# Patient Record
Sex: FEMALE | Race: WHITE | NOT HISPANIC OR LATINO | Employment: FULL TIME | URBAN - METROPOLITAN AREA
[De-identification: names, ages, dates, MRNs, and addresses within clinical notes are randomized per-mention and may not be internally consistent; named-entity substitution may affect disease eponyms.]

---

## 2024-02-10 ENCOUNTER — ANESTHESIA EVENT (OUTPATIENT)
Dept: SURGERY | Facility: OTHER | Age: 34
End: 2024-02-10
Payer: MEDICARE

## 2024-02-10 ENCOUNTER — HOSPITAL ENCOUNTER (OUTPATIENT)
Facility: OTHER | Age: 34
Discharge: HOME | End: 2024-02-11
Attending: EMERGENCY MEDICINE | Admitting: OBSTETRICS & GYNECOLOGY
Payer: MEDICARE

## 2024-02-10 ENCOUNTER — ANESTHESIA (OUTPATIENT)
Dept: SURGERY | Facility: OTHER | Age: 34
End: 2024-02-10
Payer: MEDICARE

## 2024-02-10 DIAGNOSIS — K66.8 PNEUMOPERITONEUM: ICD-10-CM

## 2024-02-10 DIAGNOSIS — R10.84 GENERALIZED ABDOMINAL PAIN: ICD-10-CM

## 2024-02-10 DIAGNOSIS — T81.31XA VAGINAL CUFF DEHISCENCE, INITIAL ENCOUNTER: Primary | ICD-10-CM

## 2024-02-10 LAB
ALBUMIN SERPL BCP-MCNC: 3.8 G/DL (ref 3.5–5.2)
ALP SERPL-CCNC: 89 U/L (ref 55–135)
ALT SERPL W/O P-5'-P-CCNC: 237 U/L (ref 10–44)
ANION GAP SERPL CALC-SCNC: 8 MMOL/L (ref 8–16)
AST SERPL-CCNC: 201 U/L (ref 10–40)
BACTERIA #/AREA URNS HPF: ABNORMAL /HPF
BASOPHILS # BLD AUTO: 0.03 K/UL (ref 0–0.2)
BASOPHILS NFR BLD: 0.2 % (ref 0–1.9)
BILIRUB SERPL-MCNC: 2.3 MG/DL (ref 0.1–1)
BILIRUB UR QL STRIP: NEGATIVE
BUN SERPL-MCNC: 13 MG/DL (ref 6–20)
CALCIUM SERPL-MCNC: 8.9 MG/DL (ref 8.7–10.5)
CHLORIDE SERPL-SCNC: 108 MMOL/L (ref 95–110)
CLARITY UR: CLEAR
CO2 SERPL-SCNC: 21 MMOL/L (ref 23–29)
COLOR UR: YELLOW
CREAT SERPL-MCNC: 0.6 MG/DL (ref 0.5–1.4)
DIFFERENTIAL METHOD BLD: ABNORMAL
EOSINOPHIL # BLD AUTO: 0.1 K/UL (ref 0–0.5)
EOSINOPHIL NFR BLD: 0.7 % (ref 0–8)
ERYTHROCYTE [DISTWIDTH] IN BLOOD BY AUTOMATED COUNT: 15.7 % (ref 11.5–14.5)
EST. GFR  (NO RACE VARIABLE): >60 ML/MIN/1.73 M^2
GLUCOSE SERPL-MCNC: 108 MG/DL (ref 70–110)
GLUCOSE UR QL STRIP: NEGATIVE
HCT VFR BLD AUTO: 38.4 % (ref 37–48.5)
HGB BLD-MCNC: 12.6 G/DL (ref 12–16)
HGB UR QL STRIP: NEGATIVE
IMM GRANULOCYTES # BLD AUTO: 0.07 K/UL (ref 0–0.04)
IMM GRANULOCYTES NFR BLD AUTO: 0.5 % (ref 0–0.5)
KETONES UR QL STRIP: ABNORMAL
LEUKOCYTE ESTERASE UR QL STRIP: ABNORMAL
LIPASE SERPL-CCNC: 10 U/L (ref 4–60)
LYMPHOCYTES # BLD AUTO: 1.2 K/UL (ref 1–4.8)
LYMPHOCYTES NFR BLD: 8 % (ref 18–48)
MCH RBC QN AUTO: 28.6 PG (ref 27–31)
MCHC RBC AUTO-ENTMCNC: 32.8 G/DL (ref 32–36)
MCV RBC AUTO: 87 FL (ref 82–98)
MICROSCOPIC COMMENT: ABNORMAL
MONOCYTES # BLD AUTO: 0.8 K/UL (ref 0.3–1)
MONOCYTES NFR BLD: 5.2 % (ref 4–15)
NEUTROPHILS # BLD AUTO: 12.4 K/UL (ref 1.8–7.7)
NEUTROPHILS NFR BLD: 85.4 % (ref 38–73)
NITRITE UR QL STRIP: POSITIVE
NRBC BLD-RTO: 0 /100 WBC
PH UR STRIP: 7 [PH] (ref 5–8)
PLATELET # BLD AUTO: 300 K/UL (ref 150–450)
PMV BLD AUTO: 8.4 FL (ref 9.2–12.9)
POTASSIUM SERPL-SCNC: 3.7 MMOL/L (ref 3.5–5.1)
PROT SERPL-MCNC: 6.4 G/DL (ref 6–8.4)
PROT UR QL STRIP: ABNORMAL
RBC # BLD AUTO: 4.4 M/UL (ref 4–5.4)
RBC #/AREA URNS HPF: 6 /HPF (ref 0–4)
SODIUM SERPL-SCNC: 137 MMOL/L (ref 136–145)
SP GR UR STRIP: 1.02 (ref 1–1.03)
SQUAMOUS #/AREA URNS HPF: 1 /HPF
URN SPEC COLLECT METH UR: ABNORMAL
UROBILINOGEN UR STRIP-ACNC: ABNORMAL EU/DL
WBC # BLD AUTO: 14.52 K/UL (ref 3.9–12.7)
WBC #/AREA URNS HPF: 13 /HPF (ref 0–5)

## 2024-02-10 PROCEDURE — 63600175 PHARM REV CODE 636 W HCPCS

## 2024-02-10 PROCEDURE — 87077 CULTURE AEROBIC IDENTIFY: CPT | Performed by: EMERGENCY MEDICINE

## 2024-02-10 PROCEDURE — 96376 TX/PRO/DX INJ SAME DRUG ADON: CPT | Mod: 59

## 2024-02-10 PROCEDURE — 81000 URINALYSIS NONAUTO W/SCOPE: CPT | Performed by: EMERGENCY MEDICINE

## 2024-02-10 PROCEDURE — 96361 HYDRATE IV INFUSION ADD-ON: CPT | Mod: 59

## 2024-02-10 PROCEDURE — 87088 URINE BACTERIA CULTURE: CPT | Performed by: EMERGENCY MEDICINE

## 2024-02-10 PROCEDURE — 25500020 PHARM REV CODE 255

## 2024-02-10 PROCEDURE — 99285 EMERGENCY DEPT VISIT HI MDM: CPT | Mod: 25

## 2024-02-10 PROCEDURE — 37000009 HC ANESTHESIA EA ADD 15 MINS: Performed by: OBSTETRICS & GYNECOLOGY

## 2024-02-10 PROCEDURE — 80053 COMPREHEN METABOLIC PANEL: CPT

## 2024-02-10 PROCEDURE — 85025 COMPLETE CBC W/AUTO DIFF WBC: CPT

## 2024-02-10 PROCEDURE — 36000709 HC OR TIME LEV III EA ADD 15 MIN: Performed by: OBSTETRICS & GYNECOLOGY

## 2024-02-10 PROCEDURE — 71000033 HC RECOVERY, INTIAL HOUR: Performed by: OBSTETRICS & GYNECOLOGY

## 2024-02-10 PROCEDURE — P9045 ALBUMIN (HUMAN), 5%, 250 ML: HCPCS | Mod: JZ,JG | Performed by: NURSE ANESTHETIST, CERTIFIED REGISTERED

## 2024-02-10 PROCEDURE — 37000008 HC ANESTHESIA 1ST 15 MINUTES: Performed by: OBSTETRICS & GYNECOLOGY

## 2024-02-10 PROCEDURE — 96365 THER/PROPH/DIAG IV INF INIT: CPT | Mod: 59

## 2024-02-10 PROCEDURE — 36000708 HC OR TIME LEV III 1ST 15 MIN: Performed by: OBSTETRICS & GYNECOLOGY

## 2024-02-10 PROCEDURE — 96375 TX/PRO/DX INJ NEW DRUG ADDON: CPT | Mod: 59

## 2024-02-10 PROCEDURE — 87186 SC STD MICRODIL/AGAR DIL: CPT | Performed by: EMERGENCY MEDICINE

## 2024-02-10 PROCEDURE — 49320 DIAG LAPARO SEPARATE PROC: CPT | Mod: ,,, | Performed by: OBSTETRICS & GYNECOLOGY

## 2024-02-10 PROCEDURE — 49320 DIAG LAPARO SEPARATE PROC: CPT | Mod: ,,, | Performed by: SPECIALIST

## 2024-02-10 PROCEDURE — 87086 URINE CULTURE/COLONY COUNT: CPT | Performed by: EMERGENCY MEDICINE

## 2024-02-10 PROCEDURE — 83690 ASSAY OF LIPASE: CPT

## 2024-02-10 PROCEDURE — 63600175 PHARM REV CODE 636 W HCPCS: Performed by: NURSE ANESTHETIST, CERTIFIED REGISTERED

## 2024-02-10 PROCEDURE — 63600175 PHARM REV CODE 636 W HCPCS: Performed by: ANESTHESIOLOGY

## 2024-02-10 PROCEDURE — 25000003 PHARM REV CODE 250: Performed by: NURSE ANESTHETIST, CERTIFIED REGISTERED

## 2024-02-10 PROCEDURE — 25000003 PHARM REV CODE 250

## 2024-02-10 PROCEDURE — D9220A PRA ANESTHESIA: Mod: CRNA,,, | Performed by: NURSE ANESTHETIST, CERTIFIED REGISTERED

## 2024-02-10 PROCEDURE — D9220A PRA ANESTHESIA: Mod: ANES,,, | Performed by: ANESTHESIOLOGY

## 2024-02-10 RX ORDER — MORPHINE SULFATE 4 MG/ML
4 INJECTION, SOLUTION INTRAMUSCULAR; INTRAVENOUS
Status: COMPLETED | OUTPATIENT
Start: 2024-02-10 | End: 2024-02-10

## 2024-02-10 RX ORDER — KETAMINE HCL IN 0.9 % NACL 50 MG/5 ML
SYRINGE (ML) INTRAVENOUS
Status: DISCONTINUED | OUTPATIENT
Start: 2024-02-10 | End: 2024-02-10

## 2024-02-10 RX ORDER — ONDANSETRON HYDROCHLORIDE 2 MG/ML
INJECTION, SOLUTION INTRAVENOUS
Status: DISCONTINUED | OUTPATIENT
Start: 2024-02-10 | End: 2024-02-10

## 2024-02-10 RX ORDER — DEXAMETHASONE SODIUM PHOSPHATE 4 MG/ML
INJECTION, SOLUTION INTRA-ARTICULAR; INTRALESIONAL; INTRAMUSCULAR; INTRAVENOUS; SOFT TISSUE
Status: DISCONTINUED | OUTPATIENT
Start: 2024-02-10 | End: 2024-02-10

## 2024-02-10 RX ORDER — LIDOCAINE HYDROCHLORIDE 20 MG/ML
INJECTION INTRAVENOUS
Status: DISCONTINUED | OUTPATIENT
Start: 2024-02-10 | End: 2024-02-10

## 2024-02-10 RX ORDER — ONDANSETRON HYDROCHLORIDE 2 MG/ML
4 INJECTION, SOLUTION INTRAVENOUS
Status: COMPLETED | OUTPATIENT
Start: 2024-02-10 | End: 2024-02-10

## 2024-02-10 RX ORDER — DIPHENHYDRAMINE HCL 25 MG
25 CAPSULE ORAL EVERY 4 HOURS PRN
Status: DISCONTINUED | OUTPATIENT
Start: 2024-02-11 | End: 2024-02-11 | Stop reason: HOSPADM

## 2024-02-10 RX ORDER — DIPHENHYDRAMINE HYDROCHLORIDE 50 MG/ML
25 INJECTION INTRAMUSCULAR; INTRAVENOUS EVERY 4 HOURS PRN
Status: DISCONTINUED | OUTPATIENT
Start: 2024-02-11 | End: 2024-02-11 | Stop reason: HOSPADM

## 2024-02-10 RX ORDER — ONDANSETRON 8 MG/1
8 TABLET, ORALLY DISINTEGRATING ORAL EVERY 8 HOURS PRN
Status: DISCONTINUED | OUTPATIENT
Start: 2024-02-11 | End: 2024-02-11 | Stop reason: HOSPADM

## 2024-02-10 RX ORDER — MEPERIDINE HYDROCHLORIDE 25 MG/ML
12.5 INJECTION INTRAMUSCULAR; INTRAVENOUS; SUBCUTANEOUS ONCE AS NEEDED
Status: DISCONTINUED | OUTPATIENT
Start: 2024-02-10 | End: 2024-02-11 | Stop reason: HOSPADM

## 2024-02-10 RX ORDER — PHENYLEPHRINE HYDROCHLORIDE 10 MG/ML
INJECTION INTRAVENOUS
Status: DISCONTINUED | OUTPATIENT
Start: 2024-02-10 | End: 2024-02-10

## 2024-02-10 RX ORDER — SODIUM CHLORIDE 0.9 % (FLUSH) 0.9 %
3 SYRINGE (ML) INJECTION
Status: DISCONTINUED | OUTPATIENT
Start: 2024-02-10 | End: 2024-02-11 | Stop reason: HOSPADM

## 2024-02-10 RX ORDER — HYDROMORPHONE HYDROCHLORIDE 2 MG/ML
0.4 INJECTION, SOLUTION INTRAMUSCULAR; INTRAVENOUS; SUBCUTANEOUS EVERY 5 MIN PRN
Status: DISCONTINUED | OUTPATIENT
Start: 2024-02-10 | End: 2024-02-11 | Stop reason: HOSPADM

## 2024-02-10 RX ORDER — FAMOTIDINE 20 MG/1
20 TABLET, FILM COATED ORAL
Status: DISCONTINUED | OUTPATIENT
Start: 2024-02-10 | End: 2024-02-11 | Stop reason: HOSPADM

## 2024-02-10 RX ORDER — SODIUM CHLORIDE 9 MG/ML
INJECTION, SOLUTION INTRAVENOUS CONTINUOUS
Status: CANCELLED | OUTPATIENT
Start: 2024-02-10

## 2024-02-10 RX ORDER — PROPOFOL 10 MG/ML
VIAL (ML) INTRAVENOUS
Status: DISCONTINUED | OUTPATIENT
Start: 2024-02-10 | End: 2024-02-10

## 2024-02-10 RX ORDER — MUPIROCIN 20 MG/G
OINTMENT TOPICAL
Status: CANCELLED | OUTPATIENT
Start: 2024-02-10

## 2024-02-10 RX ORDER — EPHEDRINE SULFATE 50 MG/ML
INJECTION, SOLUTION INTRAVENOUS
Status: DISCONTINUED | OUTPATIENT
Start: 2024-02-10 | End: 2024-02-10

## 2024-02-10 RX ORDER — OXYCODONE HYDROCHLORIDE 5 MG/1
5 TABLET ORAL
Status: DISCONTINUED | OUTPATIENT
Start: 2024-02-10 | End: 2024-02-11 | Stop reason: HOSPADM

## 2024-02-10 RX ORDER — KETOROLAC TROMETHAMINE 30 MG/ML
INJECTION, SOLUTION INTRAMUSCULAR; INTRAVENOUS
Status: DISCONTINUED | OUTPATIENT
Start: 2024-02-10 | End: 2024-02-10

## 2024-02-10 RX ORDER — ROCURONIUM BROMIDE 10 MG/ML
INJECTION, SOLUTION INTRAVENOUS
Status: DISCONTINUED | OUTPATIENT
Start: 2024-02-10 | End: 2024-02-10

## 2024-02-10 RX ORDER — ALBUMIN HUMAN 50 G/1000ML
SOLUTION INTRAVENOUS
Status: DISCONTINUED | OUTPATIENT
Start: 2024-02-10 | End: 2024-02-10

## 2024-02-10 RX ORDER — OXYCODONE HYDROCHLORIDE 5 MG/1
5 TABLET ORAL EVERY 4 HOURS PRN
Status: CANCELLED | OUTPATIENT
Start: 2024-02-11

## 2024-02-10 RX ORDER — FENTANYL CITRATE 50 UG/ML
INJECTION, SOLUTION INTRAMUSCULAR; INTRAVENOUS
Status: DISCONTINUED | OUTPATIENT
Start: 2024-02-10 | End: 2024-02-10

## 2024-02-10 RX ORDER — ACETAMINOPHEN 500 MG
1000 TABLET ORAL EVERY 6 HOURS PRN
Status: DISCONTINUED | OUTPATIENT
Start: 2024-02-11 | End: 2024-02-11 | Stop reason: HOSPADM

## 2024-02-10 RX ORDER — PROCHLORPERAZINE EDISYLATE 5 MG/ML
5 INJECTION INTRAMUSCULAR; INTRAVENOUS EVERY 30 MIN PRN
Status: DISCONTINUED | OUTPATIENT
Start: 2024-02-10 | End: 2024-02-11 | Stop reason: HOSPADM

## 2024-02-10 RX ORDER — OXYCODONE HYDROCHLORIDE 10 MG/1
10 TABLET ORAL EVERY 4 HOURS PRN
Status: DISCONTINUED | OUTPATIENT
Start: 2024-02-11 | End: 2024-02-11 | Stop reason: HOSPADM

## 2024-02-10 RX ADMIN — ROCURONIUM BROMIDE 50 MG: 10 SOLUTION INTRAVENOUS at 10:02

## 2024-02-10 RX ADMIN — ALBUMIN (HUMAN) 250 ML: 12.5 SOLUTION INTRAVENOUS at 10:02

## 2024-02-10 RX ADMIN — PHENYLEPHRINE HYDROCHLORIDE 100 MCG: 10 INJECTION INTRAVENOUS at 10:02

## 2024-02-10 RX ADMIN — EPHEDRINE SULFATE 10 MG: 50 INJECTION INTRAVENOUS at 10:02

## 2024-02-10 RX ADMIN — DEXAMETHASONE SODIUM PHOSPHATE 8 MG: 4 INJECTION, SOLUTION INTRAMUSCULAR; INTRAVENOUS at 10:02

## 2024-02-10 RX ADMIN — ONDANSETRON 4 MG: 2 INJECTION INTRAMUSCULAR; INTRAVENOUS at 08:02

## 2024-02-10 RX ADMIN — PIPERACILLIN AND TAZOBACTAM 4.5 G: 4; .5 INJECTION, POWDER, LYOPHILIZED, FOR SOLUTION INTRAVENOUS; PARENTERAL at 08:02

## 2024-02-10 RX ADMIN — SODIUM CHLORIDE, SODIUM LACTATE, POTASSIUM CHLORIDE, AND CALCIUM CHLORIDE: .6; .31; .03; .02 INJECTION, SOLUTION INTRAVENOUS at 10:02

## 2024-02-10 RX ADMIN — HYDROMORPHONE HYDROCHLORIDE 0.4 MG: 2 INJECTION INTRAMUSCULAR; INTRAVENOUS; SUBCUTANEOUS at 11:02

## 2024-02-10 RX ADMIN — SODIUM CHLORIDE, SODIUM LACTATE, POTASSIUM CHLORIDE, AND CALCIUM CHLORIDE: .6; .31; .03; .02 INJECTION, SOLUTION INTRAVENOUS at 11:02

## 2024-02-10 RX ADMIN — MORPHINE SULFATE 4 MG: 4 INJECTION, SOLUTION INTRAMUSCULAR; INTRAVENOUS at 06:02

## 2024-02-10 RX ADMIN — FENTANYL CITRATE 100 MCG: 50 INJECTION, SOLUTION INTRAMUSCULAR; INTRAVENOUS at 10:02

## 2024-02-10 RX ADMIN — ONDANSETRON 4 MG: 2 INJECTION INTRAMUSCULAR; INTRAVENOUS at 06:02

## 2024-02-10 RX ADMIN — CARBOXYMETHYLCELLULOSE SODIUM 2 DROP: 2.5 SOLUTION/ DROPS OPHTHALMIC at 10:02

## 2024-02-10 RX ADMIN — MORPHINE SULFATE 4 MG: 4 INJECTION, SOLUTION INTRAMUSCULAR; INTRAVENOUS at 08:02

## 2024-02-10 RX ADMIN — PROCHLORPERAZINE EDISYLATE 5 MG: 5 INJECTION INTRAMUSCULAR; INTRAVENOUS at 11:02

## 2024-02-10 RX ADMIN — PROPOFOL 200 MG: 10 INJECTION, EMULSION INTRAVENOUS at 10:02

## 2024-02-10 RX ADMIN — Medication 50 MG: at 10:02

## 2024-02-10 RX ADMIN — ONDANSETRON HYDROCHLORIDE 4 MG: 2 INJECTION INTRAMUSCULAR; INTRAVENOUS at 11:02

## 2024-02-10 RX ADMIN — EPHEDRINE SULFATE 5 MG: 50 INJECTION INTRAVENOUS at 10:02

## 2024-02-10 RX ADMIN — SODIUM CHLORIDE 1000 ML: 0.9 INJECTION, SOLUTION INTRAVENOUS at 06:02

## 2024-02-10 RX ADMIN — LIDOCAINE HYDROCHLORIDE 100 MG: 20 INJECTION, SOLUTION INTRAVENOUS at 10:02

## 2024-02-10 RX ADMIN — IOHEXOL 75 ML: 350 INJECTION, SOLUTION INTRAVENOUS at 07:02

## 2024-02-10 RX ADMIN — KETOROLAC TROMETHAMINE 30 MG: 30 INJECTION, SOLUTION INTRAMUSCULAR; INTRAVENOUS at 11:02

## 2024-02-10 NOTE — LETTER
February 11, 2024         [address]  [place] [address]  Phone: [phone]  Fax: [phone]       Patient: Citlalli Walton   YOB: 1990  Date of Visit: 02/11/2024    To Whom It May Concern:    Mitchell Walton  was at [facility] on 02/11/2024. The patient may return to work/school on 2/23/24 with restrictions. Patient can return to work with lifting restrictions. Patient is not to lift anything over 10lbs for an additional 4 weeks. If you have any questions or concerns, or if I can be of further assistance, please do not hesitate to contact me.    Sincerely,    Shy Bellamy MD

## 2024-02-11 VITALS
RESPIRATION RATE: 16 BRPM | HEIGHT: 65 IN | WEIGHT: 149.5 LBS | OXYGEN SATURATION: 100 % | BODY MASS INDEX: 24.91 KG/M2 | TEMPERATURE: 98 F | DIASTOLIC BLOOD PRESSURE: 60 MMHG | HEART RATE: 63 BPM | SYSTOLIC BLOOD PRESSURE: 113 MMHG

## 2024-02-11 PROCEDURE — 25000003 PHARM REV CODE 250: Performed by: STUDENT IN AN ORGANIZED HEALTH CARE EDUCATION/TRAINING PROGRAM

## 2024-02-11 PROCEDURE — 63600175 PHARM REV CODE 636 W HCPCS: Performed by: STUDENT IN AN ORGANIZED HEALTH CARE EDUCATION/TRAINING PROGRAM

## 2024-02-11 PROCEDURE — 25000003 PHARM REV CODE 250

## 2024-02-11 PROCEDURE — 63600175 PHARM REV CODE 636 W HCPCS

## 2024-02-11 RX ORDER — ONDANSETRON 4 MG/1
4 TABLET, ORALLY DISINTEGRATING ORAL EVERY 6 HOURS PRN
Qty: 30 TABLET | Refills: 0 | Status: SHIPPED | OUTPATIENT
Start: 2024-02-11

## 2024-02-11 RX ORDER — DOCUSATE SODIUM 100 MG/1
100 CAPSULE, LIQUID FILLED ORAL 2 TIMES DAILY
Qty: 60 CAPSULE | Refills: 0 | Status: SHIPPED | OUTPATIENT
Start: 2024-02-11 | End: 2024-02-11

## 2024-02-11 RX ORDER — OXYCODONE HYDROCHLORIDE 5 MG/1
5 TABLET ORAL EVERY 4 HOURS PRN
Qty: 15 TABLET | Refills: 0 | Status: SHIPPED | OUTPATIENT
Start: 2024-02-11

## 2024-02-11 RX ORDER — SODIUM CHLORIDE 9 MG/ML
INJECTION, SOLUTION INTRAVENOUS CONTINUOUS
Status: DISCONTINUED | OUTPATIENT
Start: 2024-02-11 | End: 2024-02-11 | Stop reason: HOSPADM

## 2024-02-11 RX ORDER — OXYCODONE HYDROCHLORIDE 5 MG/1
5 TABLET ORAL EVERY 4 HOURS PRN
Qty: 15 TABLET | Refills: 0 | Status: SHIPPED | OUTPATIENT
Start: 2024-02-11 | End: 2024-02-11

## 2024-02-11 RX ORDER — ONDANSETRON 4 MG/1
4 TABLET, ORALLY DISINTEGRATING ORAL EVERY 6 HOURS PRN
Qty: 30 TABLET | Refills: 0 | Status: SHIPPED | OUTPATIENT
Start: 2024-02-11 | End: 2024-02-11

## 2024-02-11 RX ORDER — DEXTROMETHORPHAN HYDROBROMIDE, GUAIFENESIN 5; 100 MG/5ML; MG/5ML
650 LIQUID ORAL EVERY 6 HOURS
Qty: 60 TABLET | Refills: 0 | Status: SHIPPED | OUTPATIENT
Start: 2024-02-11 | End: 2024-02-11

## 2024-02-11 RX ORDER — DEXTROMETHORPHAN HYDROBROMIDE, GUAIFENESIN 5; 100 MG/5ML; MG/5ML
650 LIQUID ORAL EVERY 6 HOURS
Qty: 60 TABLET | Refills: 0 | Status: SHIPPED | OUTPATIENT
Start: 2024-02-11

## 2024-02-11 RX ORDER — DOCUSATE SODIUM 100 MG/1
100 CAPSULE, LIQUID FILLED ORAL 2 TIMES DAILY
Qty: 60 CAPSULE | Refills: 0 | Status: SHIPPED | OUTPATIENT
Start: 2024-02-11

## 2024-02-11 RX ORDER — ONDANSETRON HYDROCHLORIDE 2 MG/ML
4 INJECTION, SOLUTION INTRAVENOUS EVERY 6 HOURS PRN
Status: DISCONTINUED | OUTPATIENT
Start: 2024-02-11 | End: 2024-02-11 | Stop reason: HOSPADM

## 2024-02-11 RX ADMIN — ONDANSETRON 4 MG: 2 INJECTION INTRAMUSCULAR; INTRAVENOUS at 06:02

## 2024-02-11 RX ADMIN — SODIUM CHLORIDE: 9 INJECTION, SOLUTION INTRAVENOUS at 01:02

## 2024-02-11 RX ADMIN — ONDANSETRON 8 MG: 8 TABLET, ORALLY DISINTEGRATING ORAL at 09:02

## 2024-02-11 RX ADMIN — DIPHENHYDRAMINE HYDROCHLORIDE 25 MG: 50 INJECTION, SOLUTION INTRAMUSCULAR; INTRAVENOUS at 06:02

## 2024-02-11 RX ADMIN — ONDANSETRON 4 MG: 2 INJECTION INTRAMUSCULAR; INTRAVENOUS at 12:02

## 2024-02-11 RX ADMIN — OXYCODONE HYDROCHLORIDE 10 MG: 10 TABLET ORAL at 01:02

## 2024-02-11 RX ADMIN — OXYCODONE HYDROCHLORIDE 10 MG: 10 TABLET ORAL at 09:02

## 2024-02-11 RX ADMIN — DIPHENHYDRAMINE HYDROCHLORIDE 25 MG: 50 INJECTION, SOLUTION INTRAMUSCULAR; INTRAVENOUS at 01:02

## 2024-02-11 NOTE — NURSING
Pt arrived to unit via stretcher with x1 nurse and transporter in attendance.   A/O x4.  Respirations unlabored on RA.  Skin w/d.  X3 lap sites with dermabond noted.  No active bleeding/drainage noted.  Reddened scratch noted to left leg but otherwise skin intact.  Shortly after arrival to unit pt voided spontaneously.  VSS.  See flowsheet for full assessment.  Pt c/o pain 6/10 but reports continued nausea despite Compazine given not long before arrival to unit.  Pt also requesting IVF be continued stating any time she is hospitalized she needs them r/t previous gastric Sx's and current nausea.  Will notify team for further orders.  Fall/safety precautions initiated.

## 2024-02-11 NOTE — TRANSFER OF CARE
"Anesthesia Transfer of Care Note    Patient: Citlalli Walton    Procedure(s) Performed: Procedure(s) (LRB):  Exam under anesthesia (N/A)  REPAIR, VAGINAL CUFF  LAPAROSCOPY, DIAGNOSTIC    Patient location: PACU    Anesthesia Type: general    Transport from OR: Transported from OR on room air with adequate spontaneous ventilation    Post pain: adequate analgesia    Post assessment: no apparent anesthetic complications    Post vital signs: stable    Level of consciousness: awake and alert    Nausea/Vomiting: no nausea/vomiting    Complications: none    Transfer of care protocol was followed      Last vitals: Visit Vitals  BP (!) 111/59   Pulse 88   Temp 37.1 °C (98.7 °F) (Oral)   Resp 16   Ht 5' 5" (1.651 m)   Wt 65.8 kg (145 lb)   SpO2 100%   BMI 24.13 kg/m²     "

## 2024-02-11 NOTE — H&P
[facility] Emergency Dept  Obstetrics & Gynecology  History & Physical    Patient Name: Citlalli Walton  MRN: [id number]  Admission Date: 2/10/2024  Primary Care Provider: No primary care provider on file.    Subjective:     Chief Complaint/Reason for Admission: Vaginal cuff dehiscence    History of Present Illness: Citlalli Walton is a 32 YO F who presents to the ED for evaluation of pelvic pain and vaginal bleeding. She is in town visiting for the holiday. Reports she had a JIMMIE/BS/LO on 12/14/23 for AUB/CPP/Cervical dysplasia in [place] where she lives. States she had intercourse yesterday evening for the first time since surgery and that she began having heavy vaginal bleeding and acute onset pelvic pain during intercourse. States the vaginal bleeding subsided overnight but the pain did not. She took one dose of tylenol earlier but this did not help the pain at all. Endorses a brown tinged vaginal discharge throughout the day today. States she went to the [facility] for a while but the pain worsened throughout the day and she decided to present for evaluation. Denies fevers, chills, nausea, vomiting. Does endorse feeling lightheaded on her walk to the hospital but did not lose consciousness.     PMH: anemia  PSH: JIMMIE/BS/LO, RA-gastric bypass, RA- gastric sleeve, lap magui, LTCS x 1    No current facility-administered medications on file prior to encounter.     No current outpatient medications on file prior to encounter.       Review of patient's allergies indicates:  No Known Allergies    No past medical history on file.  OB History   No obstetric history on file.     No past surgical history on file.  Family History    None       Tobacco Use    Smoking status: Not on file    Smokeless tobacco: Not on file   Substance and Sexual Activity    Alcohol use: Not on file    Drug use: Not on file    Sexual activity: Not on file     Review of Systems   Constitutional:  Negative for activity change.   Eyes:  Negative for visual  disturbance.   Respiratory:  Negative for shortness of breath.    Cardiovascular:  Negative for chest pain.   Gastrointestinal:  Positive for nausea. Negative for abdominal pain.   Genitourinary:  Positive for pelvic pain, vaginal bleeding, vaginal discharge and vaginal pain.   Musculoskeletal:  Negative for back pain.   Neurological:  Negative for headaches.   Psychiatric/Behavioral:  Negative for depression.      Objective:     Vital Signs (Most Recent):  Temp: 98.7 °F (37.1 °C) (02/10/24 1718)  Pulse: 88 (02/10/24 1718)  Resp: 16 (02/10/24 2035)  BP: (!) 111/59 (02/10/24 1718)  SpO2: 100 % (02/10/24 1718) Vital Signs (24h Range):  Temp:  [98.7 °F (37.1 °C)] 98.7 °F (37.1 °C)  Pulse:  [88] 88  Resp:  [16-20] 16  SpO2:  [100 %] 100 %  BP: (111)/(59) 111/59     Weight: 65.8 kg (145 lb)  Body mass index is 24.13 kg/m².  No LMP recorded.    Physical Exam:   Constitutional: She is oriented to person, place, and time. She appears well-developed and well-nourished.    HENT:   Head: Normocephalic and atraumatic.      Cardiovascular:  Normal rate.             Pulmonary/Chest: Effort normal.        Abdominal: Soft. She exhibits abdominal incision (well healed pfannenstiel incision and multiple laparoscopic incisions). She exhibits no distension. There is no abdominal tenderness.     Genitourinary:    Vagina normal.      Genitourinary Comments: Upon placement of speculum in vagina, loop of peristalsing small bowel seen. Bowel appears pink, no evidence of necrosis or perforation. Moderate amount of yellow tinged, watery vaginal discharge present in the vault. On BME, cuff dehisced the entire length of the closure, approximately 4 cm.              Musculoskeletal: Normal range of motion.       Neurological: She is alert and oriented to person, place, and time.    Skin: Skin is warm and dry.    Psychiatric: She has a normal mood and affect. Her behavior is normal.       Laboratory:  CBC:   Recent Labs   Lab 02/10/24  1819   WBC  14.52*   RBC 4.40   HGB 12.6   HCT 38.4      MCV 87   MCH 28.6   MCHC 32.8     CMP:   Recent Labs   Lab 02/10/24  1819      CALCIUM 8.9   ALBUMIN 3.8   PROT 6.4      K 3.7   CO2 21*      BUN 13   CREATININE 0.6   ALKPHOS 89   *   *   BILITOT 2.3*       Diagnostic Results:  CT Abdomen Pelvis With IV Contrast NO Oral Contrast  Order: [id number]  Status: Final result       Visible to patient: Yes (not seen)       Next appt: None    0 Result Notes  Details    Reading Physician Reading Date Result Priority   Huang Urrutia MD  [phone]  [phone] 2/10/2024 STAT     Narrative & Impression  EXAMINATION:  CT ABDOMEN PELVIS WITH IV CONTRAST     CLINICAL HISTORY:  Abdominal pain, acute, nonlocalized;     TECHNIQUE:  Low dose axial images, sagittal and coronal reformations were obtained from the lung bases to the pubic symphysis following the IV administration of 75 mL of Omnipaque 350 .  Oral contrast was not given.     COMPARISON:  No direct comparison is available.     FINDINGS:  There are no pleural effusions.  There is no evidence of a pneumothorax.  No airspace opacity is present.     The heart is unremarkable.  There is normal tapering of the abdominal aorta.  The celiac trunk, the SMA, and LEONA are within normal limits.  The portal veins and mesenteric veins are within normal limits.  The IVC and the remainder of the venous structures are within normal limits.     There is no evidence of lymphadenopathy in the abdomen or pelvis.     There are changes of gastric bypass surgery.  There is no evidence of gastric obstruction.  The small bowel loops are unremarkable.  The appendix is not definitely localized.  There are no secondary findings of acute appendicitis.  The large bowel is unremarkable.  There is no evidence of bowel obstruction.     The liver is unremarkable.  The patient is status post cholecystectomy.  There is intrahepatic and extrahepatic biliary dilatation.  The  spleen is unremarkable.  The pancreas is within normal limits.  The adrenal glands are unremarkable.     There are bilateral nonobstructing nephrolithiasis.  There is prominence of the right proximal ureter.  No obstructing stone identified along the course of the ureters.  There is mild wall thickening of the urinary bladder.  The patient is status post hysterectomy.  The adnexal structures are within normal limits.     There is no evidence of free fluid in the abdomen or pelvis.  There is scattered foci of free air predominantly within the upper abdomen.  There is no evidence of pneumatosis.  No portal venous air is identified.     The psoas margins are unremarkable.  The abdominal wall is within normal limits.  The osseous structures are unremarkable.     Impression:     1.  Pneumoperitoneum.  This is concerning for viscus perforation.  No clear source on this CT scan of the abdomen pelvis.  Surgical consultation is recommended.     2.  Changes of gastric bypass surgery.  No evidence of gastric obstruction.     3.  Changes of prior hysterectomy and cholecystectomy.     4.  Additional findings as above.     Case discussed with TED Dickens on  02/10/2024 at 19:36.        Electronically signed by: Huang Urrutia MD  Date:                                            02/10/2024  Time:                                           19:36           Exam Ended: 02/10/24 19:08 CST Last Resulted: 02/10/24 19:36 CST             Assessment/Plan:     Active Diagnoses:    Diagnosis Date Noted POA    PRINCIPAL PROBLEM:  Vaginal cuff dehiscence, initial encounter [T81.31XA] 02/10/2024 Yes      Problems Resolved During this Admission:     Citlalli Walton is a 34 YO F now two months postop from JIMMIE/BS/LO for AUB/CPP/Cervical dysplasia who presents with complete vaginal cuff dehiscence with bowel evisceration.     Vaginal Cuff Dehiscence:   - Exam findings as above. Loop of small bowel noted in the vaginal vault, no evidence of perforation or  necrosis.   - Patient notified of findings and discussed recommendation to proceed with urgent exam under anesthesia, vaginal cuff repair, and diagnostic laparoscopy.   - Consents signed   - Staff, Anesthesia, general surgery aware   - Receiving zosyn in ED  - To OR for above stated procedures     Bing Madden MD  Obstetrics & Gynecology  [facility] - Emergency Dept

## 2024-02-11 NOTE — DISCHARGE SUMMARY
Discharge Summary  Gynecology      Admit Date: 2/10/2024    Discharge Date and Time: 2/11/2024     Attending Physician: Azeb Barnes MD    Principal Diagnoses:   Vaginal cuff dehiscence, initial encounter    Active Hospital Problems    Diagnosis  POA    *Vaginal cuff dehiscence, initial encounter [T81.31XA]  Yes      Resolved Hospital Problems   No resolved problems to display.       Procedures:   Procedure(s) (LRB):  Exam under anesthesia (N/A)  REPAIR, VAGINAL CUFF  LAPAROSCOPY, DIAGNOSTIC    Discharged Condition: good    Hospital Course:   Citlalli Walton is a 33 y.o. y.o. No obstetric history on file. female who presented on 2/10/2024 for the above-listed procedures for the treatment of vaginal cuff dehiscence. PMH is significant for anemia. Patient tolerated the procedure well and was admitted for post-operative care. Post-operative course was uncomplicated.  On day of discharge (POD#1), patient was in stable condition, having met all post-operative milestones. She was urinating spontaneously, ambulating, and tolerating a regular diet without nausea/vomiting. Pain was well-controlled on oral medication. She was discharged with medications and follow up as listed below.     Consults: None    Significant Diagnostic Studies:  Recent Labs   Lab 02/10/24  1819   WBC 14.52*   HGB 12.6   HCT 38.4   MCV 87           Treatments:   1. Surgery as above    Disposition: Home or Self Care    Patient Instructions:   Current Discharge Medication List        START taking these medications    Details   acetaminophen (TYLENOL) 650 MG TbSR Take 1 tablet (650 mg total) by mouth every 6 (six) hours.  Qty: 60 tablet, Refills: 0      docusate sodium (COLACE) 100 MG capsule Take 1 capsule (100 mg total) by mouth 2 (two) times daily.  Qty: 60 capsule, Refills: 0      oxyCODONE (ROXICODONE) 5 MG immediate release tablet Take 1 tablet (5 mg total) by mouth every 4 (four) hours as needed for Pain.  Qty: 15 tablet, Refills: 0     Comments: Quantity prescribed more than 7 day supply? No             Discharge Procedure Orders   Diet Adult Regular     Lifting restrictions   Order Comments: No lifting > 10 lbs until postoperative appointment     No driving until:   Order Comments: No longer taking narcotics. Able to safely hit brakes without pain.     Pelvic Rest   Order Comments: Until cleared by primary OBGYN at follow up visit     No dressing needed     Notify your health care provider if you experience any of the following:  temperature >100.4     Notify your health care provider if you experience any of the following:  persistent nausea and vomiting or diarrhea     Notify your health care provider if you experience any of the following:  severe uncontrolled pain     Notify your health care provider if you experience any of the following:  redness, tenderness, or signs of infection (pain, swelling, redness, odor or green/yellow discharge around incision site)     Notify your health care provider if you experience any of the following:  difficulty breathing or increased cough     Notify your health care provider if you experience any of the following:  severe persistent headache     Notify your health care provider if you experience any of the following:  worsening rash     Notify your health care provider if you experience any of the following:  persistent dizziness, light-headedness, or visual disturbances     Notify your health care provider if you experience any of the following:  increased confusion or weakness     Notify your health care provider if you experience any of the following:   Order Comments: Vaginal bleeding saturating more than one pad per hour for >2 hours     Activity as tolerated        Follow-up Information       Your primary OBGYN. Schedule an appointment as soon as possible for a visit in 1 week(s).    Why: Postop Appointment                           Bing Madden MD   PGY-4, OB-GYN

## 2024-02-11 NOTE — CONSULTS
Please see full H&P to follow. In brief, patient is a 32 YO F with PMH significant for anemia who presented to the ED for pelvic pain and vaginal bleeding 8 weeks s/p JIMMIE/BS/LO and was found to have complete vaginal cuff dehiscence with bowel evisceration. Consented for diagnostic laparoscopy/EUA with OBGYN and General surgery. To OR.     Bing Madden MD   PGY-4, OB-GYN

## 2024-02-11 NOTE — OR NURSING
Pt states pain tolerable. No change from previous assessment. Prepared for transfer to inpt room. Family notified by Dr Barnes. Report by phone to Anita MCGUIRE.

## 2024-02-11 NOTE — ED TRIAGE NOTES
Pt presents to ED c/o generalized abdominal and vaginal pain since last night. Pt reports having rough intercourse last night and having bloody discharge after. Reports hx of hysterectomy on Dec 14, 2023. Aaox4, NAD noted.

## 2024-02-11 NOTE — NURSING
Nurses Note -- 4 Eyes      2/11/2024   1:21 AM      Skin assessed during: Admit      [] No Altered Skin Integrity Present    []Prevention Measures Documented      [x] Yes- Altered Skin Integrity Present or Discovered   [x] LDA Added if Not in Epic (Describe Wound)   [x] New Altered Skin Integrity was Present on Admit and Documented in LDA   [] Wound Image Taken    Wound Care Consulted? No    Attending Nurse:  Jesusita Forman RN/Staff Member: JAMISON Chauhan    X3 ABD lap sites with dermabond; reddened scratch to left leg

## 2024-02-11 NOTE — ED PROVIDER NOTES
"Encounter Date: 2/10/2024       History     Chief Complaint   Patient presents with    Vaginal Bleeding     Patient reports having a hysterectomy on . She had rough vaginal intercourse last night and had an episode of bloody discharge afterwards. She also has generalized abdominal pain and vaginal pain. She also has a history of Iron deficient Anemia. ABIMAEL lopez's 3. NADN     This is a 33-year-old female with iron-deficiency anemia who presents to the ED with complaints of abdominal pain and vaginal bleeding since last night.  Patient states that she was having rough sexual intercourse when she began having vaginal bleeding with associated abdominal pain. She states the vaginal bleeding has since resolved, however, the abdominal pain is worsening.  She describes the pain as "all over" and aching in nature. She has not taken anything for alleviation of symptoms.  Also reports urinary frequency over the past few days.  Patient reports recently being diagnosed with iron-deficiency anemia after presenting to her primary care physician 6 months ago and routine lab work revealed hemoglobin less than 7. Suspected to be due to status s/p weight loss surgery and heavy menstrual cycles. Patient received blood transfusion followed by multiple iron transfusions.  Hysterectomy performed 2023 secondary to heavy menstrual cycles and symptomatic anemia.  She still has her right ovary.  Abdominal surgical history significant for hysterectomy, weight loss surgery, cholecystectomy, .  Denies fever, chills, N/V/D, back pain, dysuria.        Review of patient's allergies indicates:  No Known Allergies  No past medical history on file.  No past surgical history on file.  No family history on file.     Review of Systems  As per HPI above  Physical Exam     Initial Vitals [02/10/24 1718]   BP Pulse Resp Temp SpO2   (!) 111/59 88 18 98.7 °F (37.1 °C) 100 %      MAP       --         Physical Exam    Constitutional: " She appears well-developed and well-nourished.  Non-toxic appearance. She does not appear ill. She appears distressed.   Patient tearful   HENT:   Head: Normocephalic and atraumatic.   Eyes: Conjunctivae are normal.   Neck: Neck supple.   Cardiovascular:  Normal rate and regular rhythm.           Pulmonary/Chest: Effort normal. No tachypnea. No respiratory distress.   Abdominal: Abdomen is soft and flat. There is generalized abdominal tenderness.   No right CVA tenderness.  No left CVA tenderness. There is no rebound, no guarding and negative Ziegler's sign. negative Rovsing's sign  Musculoskeletal:      Cervical back: Neck supple.     Neurological: She is alert and oriented to person, place, and time.   Skin: Skin is warm, dry and intact.   Psychiatric: She has a normal mood and affect. Her speech is normal and behavior is normal.         ED Course   Critical Care    Date/Time: 2/10/2024 8:17 PM    Performed by: Debbie Dickens PA-C  Authorized by: Grady Kwon II, MD  Direct patient critical care time: 15 minutes  Ordering / reviewing critical care time: 10 minutes  Documentation critical care time: 5 minutes  Consulting other physicians critical care time: 10 minutes  Total critical care time (exclusive of procedural time) : 40 minutes  Critical care was necessary to treat or prevent imminent or life-threatening deterioration of the following conditions: sepsis.  Critical care was time spent personally by me on the following activities: discussions with consultants, evaluation of patient's response to treatment, examination of patient, obtaining history from patient or surrogate, ordering and review of laboratory studies, ordering and review of radiographic studies and re-evaluation of patient's condition.        Labs Reviewed   URINALYSIS, REFLEX TO URINE CULTURE - Abnormal; Notable for the following components:       Result Value    Protein, UA Trace (*)     Ketones, UA 1+ (*)     Nitrite, UA Positive  (*)     Urobilinogen, UA 2.0-3.0 (*)     Leukocytes, UA 2+ (*)     All other components within normal limits    Narrative:     Specimen Source->Urine   URINALYSIS MICROSCOPIC - Abnormal; Notable for the following components:    RBC, UA 6 (*)     WBC, UA 13 (*)     Bacteria Many (*)     All other components within normal limits    Narrative:     Specimen Source->Urine   CBC W/ AUTO DIFFERENTIAL - Abnormal; Notable for the following components:    WBC 14.52 (*)     RDW 15.7 (*)     MPV 8.4 (*)     Gran # (ANC) 12.4 (*)     Immature Grans (Abs) 0.07 (*)     Gran % 85.4 (*)     Lymph % 8.0 (*)     All other components within normal limits   COMPREHENSIVE METABOLIC PANEL - Abnormal; Notable for the following components:    CO2 21 (*)     Total Bilirubin 2.3 (*)      (*)      (*)     All other components within normal limits   CULTURE, URINE   LIPASE   LIPASE          Imaging Results              CT Abdomen Pelvis With IV Contrast NO Oral Contrast (Final result)  Result time 02/10/24 19:36:09      Final result by Huang Urrutia MD (02/10/24 19:36:09)                   Impression:      1.  Pneumoperitoneum.  This is concerning for viscus perforation.  No clear source on this CT scan of the abdomen pelvis.  Surgical consultation is recommended.    2.  Changes of gastric bypass surgery.  No evidence of gastric obstruction.    3.  Changes of prior hysterectomy and cholecystectomy.    4.  Additional findings as above.    Case discussed with TED Dickens on  02/10/2024 at 19:36.      Electronically signed by: Huang Urrutia MD  Date:    02/10/2024  Time:    19:36               Narrative:    EXAMINATION:  CT ABDOMEN PELVIS WITH IV CONTRAST    CLINICAL HISTORY:  Abdominal pain, acute, nonlocalized;    TECHNIQUE:  Low dose axial images, sagittal and coronal reformations were obtained from the lung bases to the pubic symphysis following the IV administration of 75 mL of Omnipaque 350 .  Oral contrast was not  given.    COMPARISON:  No direct comparison is available.    FINDINGS:  There are no pleural effusions.  There is no evidence of a pneumothorax.  No airspace opacity is present.    The heart is unremarkable.  There is normal tapering of the abdominal aorta.  The celiac trunk, the SMA, and LEONA are within normal limits.  The portal veins and mesenteric veins are within normal limits.  The IVC and the remainder of the venous structures are within normal limits.    There is no evidence of lymphadenopathy in the abdomen or pelvis.    There are changes of gastric bypass surgery.  There is no evidence of gastric obstruction.  The small bowel loops are unremarkable.  The appendix is not definitely localized.  There are no secondary findings of acute appendicitis.  The large bowel is unremarkable.  There is no evidence of bowel obstruction.    The liver is unremarkable.  The patient is status post cholecystectomy.  There is intrahepatic and extrahepatic biliary dilatation.  The spleen is unremarkable.  The pancreas is within normal limits.  The adrenal glands are unremarkable.    There are bilateral nonobstructing nephrolithiasis.  There is prominence of the right proximal ureter.  No obstructing stone identified along the course of the ureters.  There is mild wall thickening of the urinary bladder.  The patient is status post hysterectomy.  The adnexal structures are within normal limits.    There is no evidence of free fluid in the abdomen or pelvis.  There is scattered foci of free air predominantly within the upper abdomen.  There is no evidence of pneumatosis.  No portal venous air is identified.    The psoas margins are unremarkable.  The abdominal wall is within normal limits.  The osseous structures are unremarkable.                                       Medications   piperacillin-tazobactam (ZOSYN) 4.5 g in dextrose 5 % in water (D5W) 100 mL IVPB (MB+) (has no administration in time range)   morphine injection 4 mg  (has no administration in time range)   ondansetron injection 4 mg (has no administration in time range)   famotidine tablet 20 mg (has no administration in time range)   sodium chloride 0.9% bolus 1,000 mL 1,000 mL (1,000 mLs Intravenous New Bag 2/10/24 1818)   morphine injection 4 mg (4 mg Intravenous Given 2/10/24 1817)   ondansetron injection 4 mg (4 mg Intravenous Given 2/10/24 1856)   iohexoL (OMNIPAQUE 350) injection 75 mL (75 mLs Intravenous Given 2/10/24 1916)     Medical Decision Making  Urgent evaluation of an afebrile 33-year-old female with abdominal pain and vaginal bleeding that happened after rough sexual intercourse last night.  History of recent hysterectomy 2 months ago which has been without complications.  Patient is still has her right ovary.  Patient is hemodynamically stable and nontoxic appearing, though is tearful and distress on examination.  There is generalized abdominal tenderness to exam, but belly soft and nondistended.  Given sudden onset of symptoms, will obtain imaging, basic labs.  Plan to treat, and reassess.    Differential diagnosis includes but is not limited to:  Vaginal trauma, ovarian cyst/torsion, UTI, appendicitis, viral gastroenteritis, SBO    Amount and/or Complexity of Data Reviewed  Labs: ordered. Decision-making details documented in ED Course.  Radiology: ordered. Decision-making details documented in ED Course.    Risk  Prescription drug management.  Decision regarding hospitalization.               ED Course as of 02/10/24 2017   Sat Feb 10, 2024   1843 WBC(!): 14.52 [GAGE]   1843 Hemoglobin: 12.6 [GAGE]   1916 UA with evidence of UTI [GAGE]   1918 AST(!): 201 [GAGE]   1918 ALT(!): 237 [GAGE]   1955 CT Abdomen Pelvis With IV Contrast NO Oral Contrast  Pneumoperitoneum.  This is concerning for viscus perforation. [GAGE]   2001 General surgery consulted regarding pneumoperitoneum who recommended contacted Ob/gyn as likely secondary to cuff rupture from hysterectomy.  Ob/gyn  consulted who states they will come down and evaluate patient and performed a pelvic exam.  Patient updated on her results.  Will cover with Zosyn and continue pain control at this time. [GAGE]   2010 Ob/gyn evaluated and concern for rupture of hysterectomy cuff with intestines protruding into vaginal vault. Ob/gyn obtained consents and will take immediately to surgery. Patient aware of the plan and agreeable. [GAGE]      ED Course User Index  [GAGE] Debbie Dickens PA-C                     Clinical Impression:  Final diagnoses:  [K66.8] Pneumoperitoneum (Primary)  [R10.84] Generalized abdominal pain          ED Disposition Condition    Admit Stable                Debbie Dickens PA-C  02/10/24 2016       Debbie Dickens PA-C  02/10/24 2017

## 2024-02-11 NOTE — ANESTHESIA PREPROCEDURE EVALUATION
02/10/2024  Citlalli Walton is a 33 y.o., female.      Pre-op Assessment    I have reviewed the Patient Summary Reports.     I have reviewed the Nursing Notes. I have reviewed the NPO Status.   I have reviewed the Medications.     Review of Systems  Anesthesia Hx:  No problems with previous Anesthesia                Social:  Non-Smoker       Hematology/Oncology:  Hematology Normal   Oncology Normal                                   EENT/Dental:  EENT/Dental Normal           Cardiovascular:  Exercise tolerance: good                                           Pulmonary:  Pulmonary Normal                       Renal/:  Renal/ Normal                 Hepatic/GI:  Hepatic/GI Normal                 Neurological:  Neurology Normal                                      Endocrine:  Endocrine Normal            Psych:  Psychiatric Normal                    Physical Exam  General: Well nourished, Alert and Cooperative    Airway:  Mallampati: II   Mouth Opening: Normal  TM Distance: Normal  Tongue: Normal  Neck ROM: Normal ROM    Dental:  Intact        Anesthesia Plan  Type of Anesthesia, risks & benefits discussed:    Anesthesia Type: Gen Supraglottic Airway  Intra-op Monitoring Plan: Standard ASA Monitors  Post Op Pain Control Plan: multimodal analgesia  Induction:  IV  Informed Consent: Informed consent signed with the Patient and all parties understand the risks and agree with anesthesia plan.  All questions answered.   ASA Score: 1 Emergent    Ready For Surgery From Anesthesia Perspective.     .

## 2024-02-11 NOTE — OP NOTE
OPERATIVE REPORT  DIAGNOSTIC LAPAROSCOPY    Date of procedure: 02/10/2024    PREOPERATIVE DIAGNOSIS  1. Vaginal cuff dehiscence    POSTOPERATIVE DIAGNOSIS  Same    PROCEDURE:  1. Exam under anesthesia  2. Vaginal cuff revision   3. Diagnostic laparoscopy     SURGEONS: Dr. Azeb Gilbert     ASSISTANT: Bing Madden MD PGY-4    ANESTHESIA: General    COMPLICATIONS: None    EBL: 10 cc    IV FLUIDS: Per anesthesia report     URINE OUTPUT: 275 cc    FINDINGS:   Weighted speculum placed in vagina, bowel not visualized through the cuff. All tissue edges appeared healthy. Tissue edges grasped with Allis clamps and reapproximated with figure of eight sutures of 0 vicryl.    2.  Attention turned to the abdominal wall. Abdomen entered with 5mm scope. General survey revealed some adhesion of the omentum to the falciform. R ovary and tube were grossly normal. Sigmoid colon in the posterior cul de sac appeared grossly normal. General survey of the abdomen did not reveal any abnormality of the small bowel. Vaginal cuff appeared well approximated with peritoneal edges incorporated.     PROCEDURE:   Patient was taking to the operating room where general anesthesia was administered and found to be adequate.  She was prepped and draped in the dorsal lithotomy position.  A talley was placed. A weighted sterile speculum was placed in the vagina. The above findings were noted. The edges of the vaginal cuff were grasped with Allis clamps and reapproximated with several figure of eight sutures of 0 vicryl. The vagina was copiously irrigated and suctioned.        Attention turned to the abdomen. Gloves were changed.  A 5mm incision was made in the LUQ. A Veress needle was inserted into the incision under tenting of the anterior abdominal wall.  Placement into the peritoneal cavity was confirmed via saline drop test.  The abdomen was insufflated to 15mm Hg using Carbon dioxide.    A 5mm Optiview trocar was advanced through  this incision.  Excellent hemostasis was noted.  The patient was placed in deep Trendelenburg.  An 5 mm left lateral skin incision was made with a scalpel and a 5 mm trocar was advanced through this incision under visualization of the camera.  A 5 mm right lateral skin incision was made with the scalpel.  A 5 mm trocar was advanced through this incision under visualization of the camera.  Excellent hemostasis was noted.      Attention was turned to the pelvis where the sigmoid colon was noted to be free and mobile, gently displaced off the vaginal cuff where there was no adhesive disease noted. The vaginal cuff appeared well reapproximated and hemostatic. No abnormalities of the bowels were noted. The pelvis was irrigated and suctioned.  Excellent hemostasis was noted.      Attention was turned to the anterior abdominal wall. The abdomen was deflated and all trocars were removed.  The skin was closed with 4-0 Monocryl in a subcuticular fashion.  Sponge, lap, and needle counts were correct x 2. The talley was removed. The patient was taken to the recovery room in stable condition.    Bing Madden MD   PGY-4, OB-GYN

## 2024-02-11 NOTE — PROGRESS NOTES
[facility] Surg ([address])  Gynecology   Progress Note    Patient Name: Citlalli Walton  MRN: [id number]  Admission Date: 2/10/2024  Primary Care Provider: Sanjuanita, Primary Doctor  Principal Problem: Vaginal cuff dehiscence, initial encounter    Subjective:     Interval History: POD#1 s/p EUA/Vaginal cuff revision/Dx scope.   Patient is doing well this morning. She reports mild abdominal pain that is mostly relieved by scheduled and PRN pain medications. She denies vaginal bleeding. She has  ambulated around her room and to the restroom. She is voiding spontaneously. She has not passed flatus. She has not attempted PO intake. Endorses mild nausea, denies vomiting.     Scheduled Meds:  Continuous Infusions:   sodium chloride 0.9% 100 mL/hr at 02/11/24 0105     PRN Meds:acetaminophen, diphenhydrAMINE, diphenhydrAMINE, famotidine, ondansetron, ondansetron, oxyCODONE    Review of patient's allergies indicates:  No Known Allergies    Objective:     Vital Signs (Most Recent):  Temp: 98.6 °F (37 °C) (02/11/24 0347)  Pulse: 80 (02/11/24 0347)  Resp: 18 (02/11/24 0347)  BP: (!) 103/55 (02/11/24 0347)  SpO2: 97 % (02/11/24 0347) Vital Signs (24h Range):  Temp:  [97.6 °F (36.4 °C)-98.9 °F (37.2 °C)] 98.6 °F (37 °C)  Pulse:  [] 80  Resp:  [16-20] 18  SpO2:  [96 %-100 %] 97 %  BP: (103-131)/(55-59) 103/55     Weight: 67.8 kg (149 lb 7.6 oz)  Body mass index is 24.87 kg/m².  No LMP recorded.    I&O (Last 24H):    Intake/Output Summary (Last 24 hours) at 2/11/2024 0456  Last data filed at 2/11/2024 0000  Gross per 24 hour   Intake 1650 ml   Output 275 ml   Net 1375 ml       Physical Exam:   Constitutional: She is oriented to person, place, and time. She appears well-developed.    Cardiovascular: Normal rate, regular rhythm, normal heart sounds and intact distal pulses.   Pulmonary/Chest: Effort normal and breath sounds normal. No respiratory distress. She has no wheezes.      Abdominal: Incisions clean, dry, and intact, with  overlying dermabond. Soft. Non-distended. There is mild tenderness  Genitourinary: Exam deferred  Neurological: She is alert and oriented to person, place, and time.    Skin: Nails show no clubbing.    Psychiatric: She has a normal mood and affect.     Laboratory:  No new results.     Assessment/Plan:     Active Diagnoses:    Diagnosis Date Noted POA    PRINCIPAL PROBLEM:  Vaginal cuff dehiscence, initial encounter [T81.31XA] 02/10/2024 Yes      Problems Resolved During this Admission:       Routine Post-Op Care:  POD#1, doing well, no acute events overnight; continue routine advances  - Regular diet, d/c IVF today  - Voiding spontaneously   - SCDs for DVT prophylaxis; Encourage ambulation  - Continue IS  - Continue scheduled colace  - Continue current pain regimen: scheduled tylenol, oxy IR PRN       Anticipate discharge later today after breakfast.    Bing Madden MD  Gynecology   [facility] Med Surg ([address])

## 2024-02-11 NOTE — ANESTHESIA POSTPROCEDURE EVALUATION
Anesthesia Post Evaluation    Patient: Citlalli Walton    Procedure(s) Performed: Procedure(s) (LRB):  Exam under anesthesia (N/A)  REPAIR, VAGINAL CUFF  LAPAROSCOPY, DIAGNOSTIC    Final Anesthesia Type: general      Patient location during evaluation: PACU  Patient participation: Yes- Able to Participate  Level of consciousness: awake and alert  Post-procedure vital signs: reviewed and stable  Pain management: adequate  Airway patency: patent  ELLIOTT mitigation strategies: Extubation while patient is awake  PONV status at discharge: No PONV  Anesthetic complications: no      Cardiovascular status: hemodynamically stable  Respiratory status: unassisted  Hydration status: euvolemic  Follow-up not needed.              Vitals Value Taken Time   /80 02/10/24 2347   Temp 98 02/10/24 2347   Pulse 72 02/10/24 2347   Resp 16 02/10/24 2344   SpO2 100 02/10/24 2347         No case tracking events are documented in the log.      Pain/Maris Score: Pain Rating Prior to Med Admin: 7 (2/10/2024 11:44 PM)  Pain Rating Post Med Admin: 4 (2/10/2024  6:58 PM)  Maris Score: 8 (2/10/2024 11:28 PM)

## 2024-02-13 LAB — BACTERIA UR CULT: ABNORMAL

## 2024-02-16 NOTE — OP NOTE
[facility] Surg ([address]  Surgery Department  Operative Note    SUMMARY     Patient: Citlalli Walton    Medical Record: [id number]    Date of Procedure: 2/10/2024     Surgeon: Surgeon(s) and Role:  Panel 1:     * Azeb Barnes MD - Primary  Panel 2:     * Bakari Gilbert Jr., MD - Primary    Assisting Surgeon: None    Pre-Operative Diagnosis: Vaginal cuff dehiscence, initial encounter [T81.31XA]    Post-Operative Diagnosis: Post-Op Diagnosis Codes:     * Vaginal cuff dehiscence, initial encounter [T81.31XA]    Procedure: Procedure(s) (LRB):  Exam under anesthesia (N/A)  REPAIR, VAGINAL CUFF  LAPAROSCOPY, DIAGNOSTIC    Procedure in Detail:  After completion of the pelvic and transvaginal portions of the case attention was paid to the abdomen.  A small incision made in the left upper quadrant, a Veress needle inserted, a pneumoperitoneum achieved.  A 5 mm Optiview trocar inserted.  Then under direct observation 2 additional 5 mm trocars were inserted:  A 5 mm trocar in the right mid abdomen lateral to the semilunar line.  A mirror-image 5 mm trocar inserted under direct observation on the left side of the abdomen lateral to the semilunar line.  The peritoneal cavity then carefully examined.  The vaginal cuff repair was seen to be intact.  There was no evidence of injured small-bowel or colon.  There was no feces or succus entericus present in the peritoneal cavity.  There was no evidence of active bleeding.  Peritoneal cavity was then inspected and the pneumoperitoneum released.  The skin closed with 4-0 Monocryl goal.  The wounds sterilely dressed.  The patient tolerated procedure well left the operating room in good condition.  At the end the procedure all sponge lap and instrument counts were correct.

## (undated) DEVICE — SYS SEE SHARP SCP ANTIFG LNG

## (undated) DEVICE — KITTNER ENDOSCOPIC BLNT 5MM

## (undated) DEVICE — UNDERGLOVES BIOGEL PI SZ 7 LF

## (undated) DEVICE — SOL IRR SOD CHL .9% POUR

## (undated) DEVICE — NDL INSUFFLATION VERRES 120MM

## (undated) DEVICE — SYR 10CC LUER LOCK

## (undated) DEVICE — TROCAR KII FIOS 5MM X 100MM

## (undated) DEVICE — SOL POVIDONE PREP IODINE 4 OZ

## (undated) DEVICE — GLOVE BIOGEL SKINSENSE PI 6.5

## (undated) DEVICE — SOL POVIDONE SCRUB IODINE 4 OZ

## (undated) DEVICE — PACK LAPAROSCOPY BAPTIST

## (undated) DEVICE — SUT MCRYL PLUS 4-0 PS2 27IN

## (undated) DEVICE — SUT 0 VICRYL PLUS CT-1 27IN

## (undated) DEVICE — NDL HYPO REG 25G X 1 1/2

## (undated) DEVICE — JELLY SURGILUBE 5GR

## (undated) DEVICE — SOL NORMAL USPCA 0.9%

## (undated) DEVICE — KIT WING PAD POSITIONING

## (undated) DEVICE — ELECTRODE NEEDLE 1IN

## (undated) DEVICE — GLOVE BIOGEL SKINSENSE PI 7.0

## (undated) DEVICE — PENCIL ELECTROSURG HOLST W/BLD

## (undated) DEVICE — ELECTRODE REM PLYHSV RETURN 9